# Patient Record
Sex: MALE | URBAN - NONMETROPOLITAN AREA
[De-identification: names, ages, dates, MRNs, and addresses within clinical notes are randomized per-mention and may not be internally consistent; named-entity substitution may affect disease eponyms.]

---

## 2023-09-12 ENCOUNTER — APPOINTMENT (RX ONLY)
Dept: URBAN - NONMETROPOLITAN AREA CLINIC 16 | Facility: CLINIC | Age: 68
Setting detail: DERMATOLOGY
End: 2023-09-12

## 2023-09-12 VITALS — WEIGHT: 235 LBS | HEIGHT: 73 IN

## 2023-09-12 DIAGNOSIS — B35.4 TINEA CORPORIS: ICD-10-CM | Status: RESOLVING

## 2023-09-12 PROCEDURE — ? PRESCRIPTION

## 2023-09-12 PROCEDURE — 99203 OFFICE O/P NEW LOW 30 MIN: CPT

## 2023-09-12 PROCEDURE — ? FULL BODY SKIN EXAM - DECLINED

## 2023-09-12 PROCEDURE — ? COUNSELING

## 2023-09-12 PROCEDURE — ? MEDICATION COUNSELING

## 2023-09-12 PROCEDURE — ? TREATMENT REGIMEN

## 2023-09-12 RX ORDER — KETOCONAZOLE 20 MG/G
CREAM TOPICAL
Qty: 60 | Refills: 3 | Status: ERX | COMMUNITY
Start: 2023-09-12

## 2023-09-12 RX ADMIN — KETOCONAZOLE: 20 CREAM TOPICAL at 00:00

## 2023-09-12 ASSESSMENT — SEVERITY ASSESSMENT: SEVERITY: ALMOST CLEAR

## 2023-09-12 ASSESSMENT — BSA RASH: BSA RASH: 1

## 2023-09-12 NOTE — PROCEDURE: TREATMENT REGIMEN
Initiate Treatment: Ketoconazole QOD PRN
Detail Level: Zone
Plan: Pt states that dermatitis on his feet and leg began roughly 3 months ago. He states that prior to that he did have toe nail fungus. \\n\\nHe states that he was treated with fluconazole pills for 30 days and dermatitis resolves. He also states that he has been using otz clotrimazole and triamcinolone/nyastatin with some relief. \\n\\[facility] educates pt that the toenail involvement is a harder part to treat and discussed treatment with pt if necessary. Recommends pt trials a ketoconazole cream on QOD PRN yo help keep things clear even if they are not flared. \\n\\nPt will continue topical involvment and call if flares occur.

## 2023-09-12 NOTE — PROCEDURE: MEDICATION COUNSELING
Call out to Ivan maravilla/Norma  advised of     ----- Message from Nasim Irving MD sent at 10/5/2022 10:29 AM CDT -----  A1c normal at 5.6.  Mildly elevated spot glucose.  Focused healthy diet.  Low carbs.  Regular exercise     Topical Clindamycin Counseling: Patient counseled that this medication may cause skin irritation or allergic reactions.  In the event of skin irritation, the patient was advised to reduce the amount of the drug applied or use it less frequently.   The patient verbalized understanding of the proper use and possible adverse effects of clindamycin.  All of the patient's questions and concerns were addressed.
